# Patient Record
Sex: MALE | Race: WHITE | NOT HISPANIC OR LATINO | URBAN - NONMETROPOLITAN AREA
[De-identification: names, ages, dates, MRNs, and addresses within clinical notes are randomized per-mention and may not be internally consistent; named-entity substitution may affect disease eponyms.]

---

## 2017-11-01 NOTE — PATIENT DISCUSSION
Pt educated on [employer]: OD: lázaro, OS: -0.50. Pt educated on rings around lights and neuro adaptation. Pt educated that second eye will be made slightly nearsighted for better near vision after surgery.

## 2018-12-06 PROBLEM — H43.811: Noted: 2018-12-06

## 2018-12-06 PROBLEM — H26.491: Noted: 2018-12-06

## 2018-12-06 PROBLEM — H01.021: Noted: 2018-12-06

## 2018-12-06 PROBLEM — E11.9: Noted: 2018-12-06

## 2018-12-06 PROBLEM — Z96.1: Noted: 2018-12-06

## 2018-12-06 PROBLEM — H52.4: Noted: 2018-12-06

## 2020-02-04 ENCOUNTER — IMPORTED ENCOUNTER (OUTPATIENT)
Dept: URBAN - NONMETROPOLITAN AREA CLINIC 1 | Facility: CLINIC | Age: 75
End: 2020-02-04

## 2020-02-04 PROCEDURE — 92012 INTRM OPH EXAM EST PATIENT: CPT

## 2020-02-04 NOTE — PATIENT DISCUSSION
NIDDM (2012)- Discussed diagnosis in detail with patient- Stressed importance of good blood sugar control- Optos done previously stable OU- No diabetic retinopathy seen on today's exam- Patient reports last A1 C was a 6.6 and last blood sugar was 131- Letter to [employer]- Recommend no soda's- Continue to monitorPseudophakia OU with PCO OU- Discussed diagnosis in detail with patient- PCO noted in the OU but stable and no treatment needed at this time - BAT done today OD 20/40 and OS 20/50+- Continue to monitorPVD OD- Discussed findings of exam in detail with the patient. - Optos done previously appears stable. - The risk of retinal detachment in patients with PVDs was discussed with the patient and the warning signs of retinal detachment were carefully reviewed with the patient. - The patient was warned to return to the office or contact the ophthalmologist on call immediately if they experience signs of retinal detachment. - Continue to monitorDES OU - Discussed diagnosis in detail with patient- Discussed signs and symptoms of progression- Recommend drinking plenty of water and starting Omega 3's - Recommend starting Refresh or systane through out the day- Samples of Refresh Repair given previously- Continue to monitorBlepharitits OU- Discussed diagnosis in detail with patient- Recommend [employer] baby shampoo to scrub lids daily- Continue to montior Presbyopia OU- Discussed refractive status in detail with patient- New glasses Rx given today- Continue to monitor; 's Notes: MR 2/4/20DFE 2/4/20Optos 12/5/17Sharon [employer]

## 2021-02-05 ENCOUNTER — IMPORTED ENCOUNTER (OUTPATIENT)
Dept: URBAN - NONMETROPOLITAN AREA CLINIC 1 | Facility: CLINIC | Age: 76
End: 2021-02-05

## 2021-02-05 ENCOUNTER — PREPPED CHART (OUTPATIENT)
Dept: URBAN - NONMETROPOLITAN AREA CLINIC 1 | Facility: CLINIC | Age: 76
End: 2021-02-05

## 2021-02-05 PROCEDURE — 92014 COMPRE OPH EXAM EST PT 1/>: CPT

## 2021-02-05 NOTE — PATIENT DISCUSSION
Discussed diagnosis in detail with patient. Stressed importance of good blood sugar control. No diabetic retinopathy seen on today's exam. Patient reports last A1C was 5.7 and last blood sugar was 134. Letter to Van [employer]. Recommend no soda's. Continue to monitor.

## 2021-02-05 NOTE — PATIENT DISCUSSION
Discussed findings of exam in detail with the patient. The risk of retinal detachment in patients with PVDs was discussed with the patient and the warning signs of retinal detachment were carefully reviewed with the patient. The patient was warned to return to the office or contact the ophthalmologist on call immediately if they experience signs of retinal detachment. Continue to monitor.

## 2021-02-05 NOTE — PATIENT DISCUSSION
Discussed diagnosis in detail with patient. Discussed signs and symptoms of progression. Recommend drinking plenty of water and starting Omega 3's. Recommend starting Refresh or Systane throughout the day. Samples of Refresh Repair given previously. Continue to monitor.

## 2021-02-05 NOTE — PATIENT DISCUSSION
NIDDM (2012)- Discussed diagnosis in detail with patient- Stressed importance of good blood sugar control- Optos done previously stable OU- No diabetic retinopathy seen on today's exam- Patient reports last A1 C was a 5.7 and last blood sugar was 134- Letter to Rojean Gilford- Recommend no soda's- Continue to monitorPseudophakia OU with PCO OU- Discussed diagnosis in detail with patient- PCO noted in the OU but stable and no treatment needed at this time - Continue to monitorPVD OD- Discussed findings of exam in detail with the patient. - Optos done previously appears stable. - The risk of retinal detachment in patients with PVDs was discussed with the patient and the warning signs of retinal detachment were carefully reviewed with the patient. - The patient was warned to return to the office or contact the ophthalmologist on call immediately if they experience signs of retinal detachment. - Continue to monitorDES OU - Discussed diagnosis in detail with patient- Discussed signs and symptoms of progression- Recommend drinking plenty of water and starting Omega 3's - Recommend starting Refresh or systane through out the day- Samples of Refresh Repair given previously- Continue to monitorBlepharitits OU- Discussed diagnosis in detail with patient- Recommend [employer] baby shampoo to scrub lids daily- Continue to montior Presbyopia OU- Discussed refractive status in detail with patient- New glasses Rx given today- Continue to monitor; 's Notes: MR 2/5/21DFE 2/5/21Optos 12/5/17Sharon [employer]

## 2022-02-06 ASSESSMENT — VISUAL ACUITY
OS_CC: 20/20
OD_CC: 20/20

## 2022-02-06 ASSESSMENT — TONOMETRY
OD_IOP_MMHG: 15
OS_IOP_MMHG: 16

## 2022-02-08 ENCOUNTER — COMPREHENSIVE EXAM (OUTPATIENT)
Dept: URBAN - NONMETROPOLITAN AREA CLINIC 1 | Facility: CLINIC | Age: 77
End: 2022-02-08

## 2022-02-08 DIAGNOSIS — E11.9: ICD-10-CM

## 2022-02-08 PROCEDURE — 99214 OFFICE O/P EST MOD 30 MIN: CPT

## 2022-02-08 ASSESSMENT — TONOMETRY
OS_IOP_MMHG: 15
OD_IOP_MMHG: 15

## 2022-02-08 ASSESSMENT — VISUAL ACUITY
OD_CC: 20/20
OS_CC: 20/20
OS_BAT: 20/40-1
OD_BAT: 20/40-1

## 2022-02-08 NOTE — PATIENT DISCUSSION
Explained that LED lights can cause a lot of problems with driving at night. I see no other abnormalities/etiology that would cause his visual disturbance when driving at night. Asked him not to stare directly into the lights when driving at night. If symptoms continue to occur, pt to let me know.

## 2022-02-08 NOTE — PATIENT DISCUSSION
Discussed diagnosis in detail with patient. Stressed importance of good blood sugar control. No diabetic retinopathy seen on today's exam. Letter to Liberty Nguyen. Recommend no soda's. Continue to monitor.

## 2022-04-17 ASSESSMENT — VISUAL ACUITY
OD_GLARE: 20/40-
OD_SC: 20/20-
OS_SC: 20/20
OS_SC: 20/20-2
OD_SC: 20/20
OS_GLARE: 20/50+

## 2022-04-17 ASSESSMENT — TONOMETRY
OD_IOP_MMHG: 12
OS_IOP_MMHG: 16
OD_IOP_MMHG: 15
OS_IOP_MMHG: 12

## 2023-06-12 ENCOUNTER — COMPREHENSIVE EXAM (OUTPATIENT)
Dept: URBAN - NONMETROPOLITAN AREA CLINIC 1 | Facility: CLINIC | Age: 78
End: 2023-06-12

## 2023-06-12 DIAGNOSIS — E11.9: ICD-10-CM

## 2023-06-12 DIAGNOSIS — H52.4: ICD-10-CM

## 2023-06-12 PROCEDURE — 92015 DETERMINE REFRACTIVE STATE: CPT

## 2023-06-12 PROCEDURE — 99213 OFFICE O/P EST LOW 20 MIN: CPT

## 2023-06-12 ASSESSMENT — TONOMETRY
OS_IOP_MMHG: 14
OD_IOP_MMHG: 14

## 2023-06-12 ASSESSMENT — VISUAL ACUITY
OD_CC: 20/20
OU_CC: 20/20
OS_CC: 20/20

## 2023-12-14 ENCOUNTER — EMERGENCY VISIT (OUTPATIENT)
Dept: URBAN - NONMETROPOLITAN AREA CLINIC 1 | Facility: CLINIC | Age: 78
End: 2023-12-14

## 2023-12-14 DIAGNOSIS — R51.9: ICD-10-CM

## 2023-12-14 DIAGNOSIS — E11.3291: ICD-10-CM

## 2023-12-14 PROCEDURE — 92134 CPTRZ OPH DX IMG PST SGM RTA: CPT

## 2023-12-14 PROCEDURE — 99214 OFFICE O/P EST MOD 30 MIN: CPT

## 2023-12-14 PROCEDURE — 92250 FUNDUS PHOTOGRAPHY W/I&R: CPT

## 2023-12-14 ASSESSMENT — VISUAL ACUITY
OS_CC: 20/63-
OD_CC: 20/22-1
OU_CC: 20/25-1
OS_PH: 20/50-2

## 2023-12-14 ASSESSMENT — TONOMETRY
OS_IOP_MMHG: 14
OD_IOP_MMHG: 14

## 2024-07-03 ENCOUNTER — EMERGENCY VISIT (OUTPATIENT)
Dept: URBAN - NONMETROPOLITAN AREA CLINIC 1 | Facility: CLINIC | Age: 79
End: 2024-07-03

## 2024-07-03 DIAGNOSIS — H16.223: ICD-10-CM

## 2024-07-03 DIAGNOSIS — H47.012: ICD-10-CM

## 2024-07-03 PROCEDURE — 99214 OFFICE O/P EST MOD 30 MIN: CPT

## 2024-07-03 PROCEDURE — 92250 FUNDUS PHOTOGRAPHY W/I&R: CPT

## 2024-07-03 RX ORDER — POVIDONE 2.5 MG/.5G: SOLUTION, GEL FORMING / DROPS OPHTHALMIC TWICE A DAY

## 2024-07-03 ASSESSMENT — VISUAL ACUITY
OS_CC: 20/50-2
OU_CC: 20/22-1
OD_CC: 20/25

## 2024-07-03 ASSESSMENT — TONOMETRY
OD_IOP_MMHG: 14
OS_IOP_MMHG: 14

## 2024-07-15 ENCOUNTER — CONTACT LENSES/GLASSES VISIT (OUTPATIENT)
Dept: URBAN - NONMETROPOLITAN AREA CLINIC 1 | Facility: CLINIC | Age: 79
End: 2024-07-15

## 2024-07-15 DIAGNOSIS — H52.4: ICD-10-CM

## 2024-07-15 PROCEDURE — 92015 DETERMINE REFRACTIVE STATE: CPT

## 2024-07-15 ASSESSMENT — VISUAL ACUITY
OS_PH: 20/50
OU_CC: 20/32-
OD_CC: 20/32-

## 2025-03-12 ENCOUNTER — FOLLOW UP (OUTPATIENT)
Age: 80
End: 2025-03-12

## 2025-03-12 DIAGNOSIS — E11.9: ICD-10-CM

## 2025-03-12 DIAGNOSIS — H16.223: ICD-10-CM

## 2025-03-12 DIAGNOSIS — H43.811: ICD-10-CM

## 2025-03-12 PROCEDURE — 99213 OFFICE O/P EST LOW 20 MIN: CPT
